# Patient Record
Sex: FEMALE | URBAN - METROPOLITAN AREA
[De-identification: names, ages, dates, MRNs, and addresses within clinical notes are randomized per-mention and may not be internally consistent; named-entity substitution may affect disease eponyms.]

---

## 2023-05-26 ENCOUNTER — CLAIMS CREATED FROM THE CLAIM WINDOW (OUTPATIENT)
Dept: URBAN - METROPOLITAN AREA CLINIC 109 | Facility: CLINIC | Age: 33
End: 2023-05-26
Payer: COMMERCIAL

## 2023-05-26 ENCOUNTER — WEB ENCOUNTER (OUTPATIENT)
Dept: URBAN - METROPOLITAN AREA CLINIC 109 | Facility: CLINIC | Age: 33
End: 2023-05-26

## 2023-05-26 ENCOUNTER — ERX REFILL RESPONSE (OUTPATIENT)
Dept: URBAN - METROPOLITAN AREA CLINIC 109 | Facility: CLINIC | Age: 33
End: 2023-05-26

## 2023-05-26 ENCOUNTER — DASHBOARD ENCOUNTERS (OUTPATIENT)
Age: 33
End: 2023-05-26

## 2023-05-26 VITALS
WEIGHT: 177.4 LBS | DIASTOLIC BLOOD PRESSURE: 74 MMHG | BODY MASS INDEX: 30.29 KG/M2 | HEIGHT: 64 IN | TEMPERATURE: 97.9 F | HEART RATE: 87 BPM | SYSTOLIC BLOOD PRESSURE: 122 MMHG

## 2023-05-26 DIAGNOSIS — K85.10 ACUTE BILIARY PANCREATITIS: ICD-10-CM

## 2023-05-26 DIAGNOSIS — K91.5 POST-CHOLECYSTECTOMY SYNDROME: ICD-10-CM

## 2023-05-26 DIAGNOSIS — Z90.49 S/P CHOLE: ICD-10-CM

## 2023-05-26 DIAGNOSIS — R10.13 EPIGASTRIC PAIN: ICD-10-CM

## 2023-05-26 PROBLEM — 70405009: Status: ACTIVE | Noted: 2023-05-26

## 2023-05-26 PROBLEM — 428882003: Status: ACTIVE | Noted: 2023-05-26

## 2023-05-26 PROBLEM — 48694002: Status: ACTIVE | Noted: 2023-05-26

## 2023-05-26 PROCEDURE — 99204 OFFICE O/P NEW MOD 45 MIN: CPT | Performed by: INTERNAL MEDICINE

## 2023-05-26 RX ORDER — CHOLESTYRAMINE 4 G/9G
1 SCOOP POWDER, FOR SUSPENSION ORAL ONCE A DAY
Qty: 30 | Refills: 11 | OUTPATIENT
Start: 2023-05-26

## 2023-05-26 RX ORDER — PANTOPRAZOLE SODIUM 40 MG/1
1 TABLET TABLET, DELAYED RELEASE ORAL ONCE A DAY
Qty: 30 | Refills: 5 | OUTPATIENT
Start: 2023-05-26

## 2023-05-26 RX ORDER — CHOLESTYRAMINE 4 G/9G
1 SCOOP POWDER, FOR SUSPENSION ORAL ONCE A DAY
Qty: 30 | Refills: 11 | OUTPATIENT
Start: 2023-05-26 | End: 2023-05-26

## 2023-05-26 NOTE — HPI-TODAY'S VISIT:
Ms. Payne is a 33 y/o F who is here for abd pain and changes in bowel habits. She reports that since her CCY in 2021, her bowel have not been the same. They are yellow, loose, and urgent after fatty greasy foods. She denies trying cholestyrimine. Also c/o RUQ and epigastric burning which will radiate to her back. Eating acidic foods increases her pain. She is currently taking pantoprazole 20mg which helps, but she still has symtpoms. She states that when she eats "clean" she does not even need to take her PPI and is nearly pain free. She states she cannot tolerate H2 or omeprazole 2/2 palpatations. She will also occasionally take gaviscon prior to eating which will supress GERD symptoms. Reports last h pylori test was negative. Of note, she was dx with a splenic AA which causes her much anxiety. Anytime she has RUQ pain, she is fearful that it ruptured and she will get to the ED for eval. Last CT 3/31/23 did not reveal any acute GI source of pain and she was to f/u with GI.

## 2023-07-28 ENCOUNTER — OFFICE VISIT (OUTPATIENT)
Dept: URBAN - METROPOLITAN AREA CLINIC 109 | Facility: CLINIC | Age: 33
End: 2023-07-28